# Patient Record
Sex: MALE | URBAN - METROPOLITAN AREA
[De-identification: names, ages, dates, MRNs, and addresses within clinical notes are randomized per-mention and may not be internally consistent; named-entity substitution may affect disease eponyms.]

---

## 2022-10-03 ENCOUNTER — OFFICE VISIT (OUTPATIENT)
Dept: URBAN - METROPOLITAN AREA CLINIC 35 | Facility: CLINIC | Age: 57
End: 2022-10-03

## 2022-10-03 NOTE — HPI-COLONOSCOPY SCREENING
57 year old male patient presents today for a colorectal cancer screening. Patient admits/denies this will be his first colonoscopy. He admits/denies a family history of colon, gastric, or esophageal cancer/polyps. Currently reports - - bowel movements - - with/out strain. His stools are - - with/out blood, mucus, or melena. He admits/denies any episodes of rectal pain or pruritus ani.

## 2024-10-11 ENCOUNTER — DASHBOARD ENCOUNTERS (OUTPATIENT)
Age: 59
End: 2024-10-11

## 2024-10-11 ENCOUNTER — OFFICE VISIT (OUTPATIENT)
Dept: URBAN - METROPOLITAN AREA CLINIC 78 | Facility: CLINIC | Age: 59
End: 2024-10-11
Payer: COMMERCIAL

## 2024-10-11 VITALS
RESPIRATION RATE: 14 BRPM | DIASTOLIC BLOOD PRESSURE: 81 MMHG | HEART RATE: 70 BPM | TEMPERATURE: 98.1 F | HEIGHT: 68 IN | BODY MASS INDEX: 26.01 KG/M2 | SYSTOLIC BLOOD PRESSURE: 119 MMHG | WEIGHT: 171.6 LBS

## 2024-10-11 DIAGNOSIS — B37.0 ORAL THRUSH: ICD-10-CM

## 2024-10-11 DIAGNOSIS — N40.0 BENIGN PROSTATIC HYPERPLASIA, UNSPECIFIED WHETHER LOWER URINARY TRACT SYMPTOMS PRESENT: ICD-10-CM

## 2024-10-11 DIAGNOSIS — Z09 HOSPITAL DISCHARGE FOLLOW-UP: ICD-10-CM

## 2024-10-11 DIAGNOSIS — K57.92 ACUTE DIVERTICULITIS: ICD-10-CM

## 2024-10-11 DIAGNOSIS — K40.90 LEFT INGUINAL HERNIA: ICD-10-CM

## 2024-10-11 PROBLEM — 266569009: Status: ACTIVE | Noted: 2024-10-11

## 2024-10-11 PROBLEM — 735593008: Status: ACTIVE | Noted: 2024-10-11

## 2024-10-11 PROCEDURE — 99204 OFFICE O/P NEW MOD 45 MIN: CPT | Performed by: INTERNAL MEDICINE

## 2024-10-11 PROCEDURE — 99244 OFF/OP CNSLTJ NEW/EST MOD 40: CPT | Performed by: INTERNAL MEDICINE

## 2024-10-11 RX ORDER — MELOXICAM 15 MG/1
TABLET ORAL
Qty: 30 TABLET | Status: ACTIVE | COMMUNITY

## 2024-10-11 RX ORDER — SILDENAFIL 100 MG/1
TAKE UP TO 1 TABLET BY MOUTH AS NEEDED BY MOUTH FOR ERECTILE DYSFUNCTION TABLET, FILM COATED ORAL
Qty: 8 EACH | Refills: 0 | Status: ACTIVE | COMMUNITY

## 2024-10-11 RX ORDER — NYSTATIN 100000 [USP'U]/ML
4 ML SUSPENSION ORAL
Qty: 224 ML | Refills: 0 | OUTPATIENT

## 2024-10-11 RX ORDER — LEVOTHYROXINE SODIUM 150 UG/1
TAKE 1 TABLET 6 DAYS/7 DAYS & 1.5 TABS ON THURSDAY ORALLY ONCE A DAY TABLET ORAL
Qty: 96 EACH | Refills: 1 | Status: ACTIVE | COMMUNITY

## 2024-10-11 RX ORDER — SUMATRIPTAN 20 MG/100UL
PLEASE SEE ATTACHED FOR DETAILED DIRECTIONS SPRAY NASAL
Qty: 6 EACH | Refills: 0 | Status: ACTIVE | COMMUNITY

## 2024-10-11 RX ORDER — PROMETHAZINE HYDROCHLORIDE AND DEXTROMETHORPHAN HYDROBROMIDE 6.25; 15 MG/5ML; MG/5ML
SYRUP ORAL
Qty: 140 MILLILITER | Status: ON HOLD | COMMUNITY

## 2024-10-11 RX ORDER — PROMETHAZINE HYDROCHLORIDE AND DEXTROMETHORPHAN HYDROBROMIDE 6.25; 15 MG/5ML; MG/5ML
SYRUP ORAL
Qty: 140 MILLILITER | Status: ACTIVE | COMMUNITY

## 2024-10-11 NOTE — PHYSICAL EXAM HENT:
Head,  normocephalic,  atraumatic,  Face,  Face within normal limits,  Ears,  External ears within normal limits,  Nose/Nasopharynx,  External nose  normal appearance,Mouth and Throat,  Oral cavity appearance normal,,  Lips,  Appearance normal Tongue reveals apthous lesions

## 2024-10-11 NOTE — HPI-TODAY'S VISIT:
Patient was referred by Dr. Ty Mason A copy of this document will be sent to the physician - Reason for consultation: Lower abdominal pain and distension - History of presenting complaint: - Patient presented to the emergency room on 10/08/2024 with lower abdominal pain and distension - Low-grade fever at home, which increased to 100.7 degree(s) F in the ER - History of inguinal hernia and large prostate (BPH) - Patient tried to manage symptoms at home but they persisted - Past medical history: - Benign prostatic hyperplasia (BPH) - Surgical history: Holmium laser enucleation of prostate (HoLEP) performed by Dr. Fraser at [place] - Schwannoma removal surgery (location not specified) - Current medications: Not specified - Social history: - Patient plans to travel to [place] for 2 weeks starting 10/19/2024 - Allergies: Not specified  Inguinal area: Patient reports soft, movable mass when standing, which disappears when lying down - Investigations: - Labs (from ER visit on 10/08/2024): - Sodium: 135 mEq/L - Potassium: 3.9 mEq/L - Creatinine: 1.12 mg/dL - Albumin: Normal (value not specified) - Bilirubin: 1.3 mg/dL (slightly elevated) - White blood cell count: 17.4 x 10^3/uL (elevated) - Hemoglobin: 15.9 g/dL - Platelets: 245.3 x 10^3/uL - Lipase: Normal (value not specified) - CAT scan with IV contrast: - Acute uncomplicated sigmoid diverticulitis - Surgical changes of the prostate with persistent bladder wall thickening - Mild bilateral renal hyperinopathy - Small fat-containing left inguinal hernia noted

## 2024-12-02 ENCOUNTER — TELEPHONE ENCOUNTER (OUTPATIENT)
Dept: URBAN - METROPOLITAN AREA CLINIC 78 | Facility: CLINIC | Age: 59
End: 2024-12-02

## 2024-12-02 RX ORDER — SODIUM PICOSULFATE, MAGNESIUM OXIDE, AND ANHYDROUS CITRIC ACID 12; 3.5; 1 G/175ML; G/175ML; MG/175ML
175 ML THE FIRST DOSE THE EVENING BEFORE AND SECOND DOSE THE MORNING OF COLONOSCOPY LIQUID ORAL
Qty: 1 UNSPECIFIED | Refills: 0 | OUTPATIENT
Start: 2024-12-02 | End: 2024-12-03

## 2024-12-12 ENCOUNTER — OFFICE VISIT (OUTPATIENT)
Dept: URBAN - METROPOLITAN AREA SURGERY CENTER 15 | Facility: SURGERY CENTER | Age: 59
End: 2024-12-12